# Patient Record
Sex: FEMALE | URBAN - METROPOLITAN AREA
[De-identification: names, ages, dates, MRNs, and addresses within clinical notes are randomized per-mention and may not be internally consistent; named-entity substitution may affect disease eponyms.]

---

## 2023-01-20 ENCOUNTER — TELEPHONE (OUTPATIENT)
Dept: FAMILY MEDICINE CLINIC | Age: 33
End: 2023-01-20

## 2023-01-20 NOTE — TELEPHONE ENCOUNTER
Lvm to r/s 6/19 appt due to provider out of the office. Can r/s for next available 30 min slot in June (preferably not a Friday).      Future Appointments   Date Time Provider Shelby Alberto   6/19/2023  8:00 AM Shanita Israel DO [facility]

## 2023-06-05 ENCOUNTER — TELEPHONE (OUTPATIENT)
Dept: FAMILY MEDICINE CLINIC | Facility: CLINIC | Age: 33
End: 2023-06-05

## 2023-06-05 NOTE — TELEPHONE ENCOUNTER
LVM informing patient that Dr. Svetlana Barahona will be leaving the office and that there will be no providers at this location that will be able to transition her care - advised she can keep appointment but if interested she can call back to get contact to one of our other offices that is taking new patients ([facility]

## 2023-06-19 NOTE — PROGRESS NOTES
Heather Nguyễn (: 1990) is a 28 y.o. female, NEW TO PROVIDER, here to establish care for:    ICD-10-CM    1. Establishing care with new doctor, encounter for  Z76.89       2. Liquid stool  R19.7 Lipid Panel     TSH + Free T4 Panel     Comprehensive Metabolic Panel     CBC with Auto Differential     Hemoglobin A1C     US ABDOMEN COMPLETE      3. Green stool  R19.5 Lipid Panel     TSH + Free T4 Panel     Comprehensive Metabolic Panel     CBC with Auto Differential     Hemoglobin A1C     US ABDOMEN COMPLETE      4. Routine adult health maintenance  Z00.00 Lipid Panel     TSH + Free T4 Panel     Comprehensive Metabolic Panel     CBC with Auto Differential     Hemoglobin A1C      5. Need for hepatitis C screening test  Z11.59 Hepatitis C Ab, Rflx to Qt by PCR      6.  Excess body and facial hair  L68.9 Lipid Panel     TSH + Free T4 Panel     Comprehensive Metabolic Panel     CBC with Auto Differential     Hemoglobin A1C     US ABDOMEN COMPLETE     Testosterone, free, total          Assessment   Plan   Recently noticed when wiping or after having a bowel movement, #greenish/tan liquid - Started ~1mo  Tried increasing fiber with improvement   Plan to eval labs and would encourage abdominal ultrasound if not improving or worsening, ordered 2023    #Excess body and facial hair, plan to eval early AM testosterone    HM  Declined STD screening         Orders Placed This Encounter   Procedures    US ABDOMEN COMPLETE           Standing Status:   Future     Standing Expiration Date:   2024     Scheduling Instructions:      bernice    Lipid Panel     Standing Status:   Future     Number of Occurrences:   1     Standing Expiration Date:   2024    TSH + Free T4 Panel     Standing Status:   Future     Number of Occurrences:   1     Standing Expiration Date:   2024    Comprehensive Metabolic Panel     Standing Status:   Future     Number of Occurrences:   1     Standing Expiration Date:   2024    CBC with

## 2023-06-20 ENCOUNTER — OFFICE VISIT (OUTPATIENT)
Dept: FAMILY MEDICINE CLINIC | Facility: CLINIC | Age: 33
End: 2023-06-20
Payer: COMMERCIAL

## 2023-06-20 VITALS
TEMPERATURE: 97.3 F | DIASTOLIC BLOOD PRESSURE: 70 MMHG | BODY MASS INDEX: 23.05 KG/M2 | SYSTOLIC BLOOD PRESSURE: 107 MMHG | HEIGHT: 64 IN | HEART RATE: 73 BPM | WEIGHT: 135 LBS | OXYGEN SATURATION: 98 % | RESPIRATION RATE: 17 BRPM

## 2023-06-20 DIAGNOSIS — L68.9 EXCESS BODY AND FACIAL HAIR: ICD-10-CM

## 2023-06-20 DIAGNOSIS — Z76.89 ESTABLISHING CARE WITH NEW DOCTOR, ENCOUNTER FOR: Primary | ICD-10-CM

## 2023-06-20 DIAGNOSIS — Z00.00 ROUTINE ADULT HEALTH MAINTENANCE: ICD-10-CM

## 2023-06-20 DIAGNOSIS — R19.7 LIQUID STOOL: ICD-10-CM

## 2023-06-20 DIAGNOSIS — R19.5 GREEN STOOL: ICD-10-CM

## 2023-06-20 DIAGNOSIS — Z11.59 NEED FOR HEPATITIS C SCREENING TEST: ICD-10-CM

## 2023-06-20 PROCEDURE — 99204 OFFICE O/P NEW MOD 45 MIN: CPT | Performed by: STUDENT IN AN ORGANIZED HEALTH CARE EDUCATION/TRAINING PROGRAM

## 2023-06-20 SDOH — ECONOMIC STABILITY: FOOD INSECURITY: WITHIN THE PAST 12 MONTHS, YOU WORRIED THAT YOUR FOOD WOULD RUN OUT BEFORE YOU GOT MONEY TO BUY MORE.: SOMETIMES TRUE

## 2023-06-20 SDOH — ECONOMIC STABILITY: INCOME INSECURITY: HOW HARD IS IT FOR YOU TO PAY FOR THE VERY BASICS LIKE FOOD, HOUSING, MEDICAL CARE, AND HEATING?: SOMEWHAT HARD

## 2023-06-20 SDOH — ECONOMIC STABILITY: FOOD INSECURITY: WITHIN THE PAST 12 MONTHS, THE FOOD YOU BOUGHT JUST DIDN'T LAST AND YOU DIDN'T HAVE MONEY TO GET MORE.: SOMETIMES TRUE

## 2023-06-20 SDOH — ECONOMIC STABILITY: HOUSING INSECURITY
IN THE LAST 12 MONTHS, WAS THERE A TIME WHEN YOU DID NOT HAVE A STEADY PLACE TO SLEEP OR SLEPT IN A SHELTER (INCLUDING NOW)?: NO

## 2023-06-20 ASSESSMENT — PATIENT HEALTH QUESTIONNAIRE - PHQ9
SUM OF ALL RESPONSES TO PHQ QUESTIONS 1-9: 0
1. LITTLE INTEREST OR PLEASURE IN DOING THINGS: 0
SUM OF ALL RESPONSES TO PHQ9 QUESTIONS 1 & 2: 0
SUM OF ALL RESPONSES TO PHQ QUESTIONS 1-9: 0
2. FEELING DOWN, DEPRESSED OR HOPELESS: 0

## 2023-06-20 NOTE — PROGRESS NOTES
Mikayla Patrick is a 28 y.o. female (: 1990) presenting to address:    Chief Complaint   Patient presents with    Establish Care       Vitals:    23 0755   BP: 107/70   Pulse: 73   Resp: 17   Temp: 97.3 °F (36.3 °C)   SpO2: 98%       Coordination of Care Questionaire:   1. \"Have you been to the ER, urgent care clinic since your last visit? Hospitalized since your last visit? \" No    2. \"Have you seen or consulted any other health care providers outside of the [address] [place] [address] since your last visit? \" No     3. For patients aged 39-70: Has the patient had a colonoscopy / FIT/ Cologuard? NA - based on age      If the patient is female:    4. For patients aged 41-77: Has the patient had a mammogram within the past 2 years? NA - based on age or sex      11. For patients aged 21-65: Has the patient had a pap smear? No    Advanced Directive:   1. Do you have an Advanced Directive? No    2. Would you like information on Advanced Directives?  No

## 2023-06-20 NOTE — PATIENT INSTRUCTIONS
Call [phone] to schedule your ultrasound imaging with Farhana. Increase fiber -- The recommended amount of dietary fiber is 20 to 35 grams of fiber per day. By reading the product information panel on the side of the package, you can determine the number of grams of fiber per serving. Fiber side effects -- Consuming large amounts of fiber can cause abdominal bloating or gas; this can be minimized by starting with a small amount and slowly increasing until stools become softer and more frequent.

## 2023-06-21 LAB
HEPATITIS C ANTIBODY: NORMAL
T4 FREE: 1.2 NG/DL (ref 0.9–1.8)
TSH SERPL DL<=0.05 MIU/L-ACNC: 1.08 MCU/ML (ref 0.27–4.2)

## 2023-06-27 ENCOUNTER — TELEPHONE (OUTPATIENT)
Dept: FAMILY MEDICINE CLINIC | Facility: CLINIC | Age: 33
End: 2023-06-27

## 2023-06-28 ENCOUNTER — TELEPHONE (OUTPATIENT)
Dept: FAMILY MEDICINE CLINIC | Facility: CLINIC | Age: 33
End: 2023-06-28

## 2023-06-29 LAB
TESTOSTERONE, FREE DIRECT: 1.3 PG/ML (ref 0.1–6.4)
TESTOSTERONE: 22 NG/DL (ref 2–45)